# Patient Record
Sex: FEMALE | Race: AMERICAN INDIAN OR ALASKA NATIVE | NOT HISPANIC OR LATINO | URBAN - METROPOLITAN AREA
[De-identification: names, ages, dates, MRNs, and addresses within clinical notes are randomized per-mention and may not be internally consistent; named-entity substitution may affect disease eponyms.]

---

## 2024-02-08 ENCOUNTER — EMERGENCY (EMERGENCY)
Facility: HOSPITAL | Age: 29
LOS: 1 days | Discharge: HOME | End: 2024-02-08
Attending: STUDENT IN AN ORGANIZED HEALTH CARE EDUCATION/TRAINING PROGRAM | Admitting: STUDENT IN AN ORGANIZED HEALTH CARE EDUCATION/TRAINING PROGRAM
Payer: MEDICAID

## 2024-02-08 VITALS
SYSTOLIC BLOOD PRESSURE: 150 MMHG | DIASTOLIC BLOOD PRESSURE: 74 MMHG | HEART RATE: 96 BPM | WEIGHT: 175.05 LBS | TEMPERATURE: 98 F | RESPIRATION RATE: 18 BRPM | OXYGEN SATURATION: 96 %

## 2024-02-08 PROCEDURE — 99284 EMERGENCY DEPT VISIT MOD MDM: CPT

## 2024-02-08 NOTE — ED ADULT TRIAGE NOTE - CHIEF COMPLAINT QUOTE
c/o headache to posterior side s/p hitting head against a mirror.  endorsing ringing to L ear with nausea, no vomiting. denies LOC/blood thinner use hx. HTN

## 2024-02-08 NOTE — ED ADULT TRIAGE NOTE - MODE OF ARRIVAL
Walk in
11-year-old female with no past medical history brought in for first-time seizure with full body shaking for about 30 seconds.  The episode occurred just prior to arrival followed by 5-minute period with patient was confused and sleepy.  Patient does not recall the episode.  No precipitating events.  No tongue biting or urinary incontinence.  Patient was treated for sinus infection 2 weeks ago with amoxicillin of which symptoms resolved.  No recent fever.  Father did not witness the episode, but heard her foot hitting the door as she was shaking.  They noticed that she had blood from her nose on the dresser in the wall, so suspect that she hit her head on the dresser on the way down.  Father noted that over the past 1 to 2 months, patient complained of spacing out while at school, but is aware of what is going on during those episodes.  No drug use.  No current complaints.  A cousin and the family also had a one-time episode of seizure around this age that self resolved and never reoccurred.  Exam - Gen - NAD, Head - NCAT, Pharynx - clear, MMM, TM - clear b/l, face–mild ecchymosis at the nasal bridge and at medial left eyebrow, nares–no septal hematoma, no active bleeding heart - RRR, no m/g/r, Lungs - CTAB, no w/c/r, Abdomen - soft, NT, ND, Skin - No rash, Extremities - FROM, no edema, erythema, ecchymosis, Neuro - CN 2-12 intact, nl strength and sensation, nl gait.  Plan–labs, head CT, neurology consult.  Neurology recommended admission for video EEG.  Diagnosis–seizure-like activity.

## 2024-02-09 VITALS
HEART RATE: 91 BPM | TEMPERATURE: 98 F | SYSTOLIC BLOOD PRESSURE: 142 MMHG | RESPIRATION RATE: 17 BRPM | OXYGEN SATURATION: 100 % | DIASTOLIC BLOOD PRESSURE: 78 MMHG

## 2024-02-09 RX ORDER — ACETAMINOPHEN 500 MG
975 TABLET ORAL ONCE
Refills: 0 | Status: COMPLETED | OUTPATIENT
Start: 2024-02-09 | End: 2024-02-09

## 2024-02-09 RX ORDER — IBUPROFEN 200 MG
400 TABLET ORAL ONCE
Refills: 0 | Status: COMPLETED | OUTPATIENT
Start: 2024-02-09 | End: 2024-02-09

## 2024-02-09 RX ADMIN — Medication 975 MILLIGRAM(S): at 01:18

## 2024-02-09 RX ADMIN — Medication 400 MILLIGRAM(S): at 01:18

## 2024-02-09 NOTE — ED ADULT NURSE NOTE - OBJECTIVE STATEMENT
Pt arrives to intake 10C C/O headache to posterior side s/p hitting head against a mirror. Pt endorses ringing to L ear with nausea, no vomiting. PMHx of HTN. AIrway intact, respirations are even and unlabored, abdomen is soft and nondistended, capillary refill 2 seconds bilaterally. SKin is clean, dry, and intact. Medicated as per EMAR. Bed in lowest position, safety maintained.

## 2024-02-09 NOTE — ED PROVIDER NOTE - PATIENT PORTAL LINK FT
You can access the FollowMyHealth Patient Portal offered by [facility] by registering at the following website: [email or web address]. By joining [employer]s FollowMyHealth portal, you will also be able to view your health information using other applications (apps) compatible with our system.

## 2024-02-09 NOTE — ED PROVIDER NOTE - CLINICAL SUMMARY MEDICAL DECISION MAKING FREE TEXT BOX
28-year-old female, past medical history of hypertension, presents to ED complaining of left-sided headache status post small chest neuro following on back of head earlier tonight.  Patient states neuro did not shatter.  Patient denies LOC and is not on anticoagulation.  Patient endorsed initially left-sided tinnitus, nausea, lightheadedness/dizziness, and increased anxiety after accident.  Patient states symptoms mostly resolved now since presenting to ED.  Patient denies fever/chills, vision changes, chest pain, shortness of breath, abdominal pain, nausea/vomiting/diarrhea, urinary symptoms, weakness/numbness/tingling, facial droop, slurred speech or any other symptoms at this time.    Vital signs stable.  Physical exam significant for well-appearing female in no acute distress.  Head is normocephalic/atraumatic.  Extraocular movements intact.  Pupils equal round and reactive to light.  Cranial nerves II to XII intact.  Oropharynx is clear.  Trachea is midline.  Heart is regular rate and rhythm without murmur.  Lungs clear to auscultation bilaterally.  Abdomen is soft and nontender/nondistended.  Chest wall without tenderness to palpation.  No C/T/L spinal tenderness.  Patient with left paracervical tenderness to palpation.  No lower extremity edema.  Pulses are 2+ throughout.  Skin is warm and well-perfused without rashes.  Normal strength and sensation in all extremities.  Normal finger-to-nose.  No pronator drift.  Negative Romberg.  Gait intact.  Otherwise unremarkable neuroexam.    Patient likely presenting with signs symptoms of concussion status post head injury.  Per NEXUS CT head protocol, pt does not require CTH at this time.  Offered Tylenol/Motrin for pain relief.  Patient expressed understanding and agreement with plan.  Will DC with strict return precautions and recommended PCP follow-up.

## 2024-02-09 NOTE — ED ADULT NURSE NOTE - NSFALLUNIVINTERV_ED_ALL_ED
Bed/Stretcher in lowest position, wheels locked, appropriate side rails in place/Call bell, personal items and telephone in reach/Instruct patient to call for assistance before getting out of bed/chair/stretcher/Non-slip footwear applied when patient is off stretcher/[place] to call system/Physically safe environment - no spills, clutter or unnecessary equipment/Purposeful proactive rounding/Room/bathroom lighting operational, light cord in reach

## 2025-05-14 ENCOUNTER — APPOINTMENT (OUTPATIENT)
Dept: VASCULAR SURGERY | Facility: CLINIC | Age: 30
End: 2025-05-14

## 2025-05-14 PROBLEM — Z00.00 ENCOUNTER FOR PREVENTIVE HEALTH EXAMINATION: Status: ACTIVE | Noted: 2025-05-14
